# Patient Record
Sex: FEMALE
[De-identification: names, ages, dates, MRNs, and addresses within clinical notes are randomized per-mention and may not be internally consistent; named-entity substitution may affect disease eponyms.]

---

## 2022-09-07 ENCOUNTER — HOSPITAL ENCOUNTER (EMERGENCY)
Dept: HOSPITAL 5 - ED | Age: 57
LOS: 1 days | Discharge: HOME | End: 2022-09-08
Payer: SELF-PAY

## 2022-09-07 DIAGNOSIS — Z79.899: ICD-10-CM

## 2022-09-07 DIAGNOSIS — F15.10: Primary | ICD-10-CM

## 2022-09-07 LAB
ALBUMIN SERPL-MCNC: 4.3 G/DL (ref 3.9–5)
ALT SERPL-CCNC: 17 UNITS/L (ref 7–56)
BACTERIA #/AREA URNS HPF: (no result) /HPF
BASOPHILS # (AUTO): 0.1 K/MM3 (ref 0–0.1)
BASOPHILS NFR BLD AUTO: 1.2 % (ref 0–1.8)
BUN SERPL-MCNC: 25 MG/DL (ref 7–17)
BUN/CREAT SERPL: 36 %
CALCIUM SERPL-MCNC: 8.9 MG/DL (ref 8.4–10.2)
EOSINOPHIL # BLD AUTO: 0.3 K/MM3 (ref 0–0.4)
EOSINOPHIL NFR BLD AUTO: 4.7 % (ref 0–4.3)
HCT VFR BLD CALC: 42.1 % (ref 30.3–42.9)
HEMOLYSIS INDEX: 51
HGB BLD-MCNC: 14 GM/DL (ref 10.1–14.3)
LYMPHOCYTES # BLD AUTO: 2 K/MM3 (ref 1.2–5.4)
LYMPHOCYTES NFR BLD AUTO: 26.3 % (ref 13.4–35)
MCHC RBC AUTO-ENTMCNC: 33 % (ref 30–34)
MCV RBC AUTO: 89 FL (ref 79–97)
MONOCYTES # (AUTO): 0.4 K/MM3 (ref 0–0.8)
MONOCYTES % (AUTO): 5.1 % (ref 0–7.3)
PLATELET # BLD: 285 K/MM3 (ref 140–440)
RBC # BLD AUTO: 4.75 M/MM3 (ref 3.65–5.03)
RBC #/AREA URNS HPF: 1 /HPF (ref 0–6)
WBC #/AREA URNS HPF: 3 /HPF (ref 0–6)

## 2022-09-07 PROCEDURE — 99285 EMERGENCY DEPT VISIT HI MDM: CPT

## 2022-09-07 PROCEDURE — 80053 COMPREHEN METABOLIC PANEL: CPT

## 2022-09-07 PROCEDURE — 71045 X-RAY EXAM CHEST 1 VIEW: CPT

## 2022-09-07 PROCEDURE — G0480 DRUG TEST DEF 1-7 CLASSES: HCPCS

## 2022-09-07 PROCEDURE — 80307 DRUG TEST PRSMV CHEM ANLYZR: CPT

## 2022-09-07 PROCEDURE — 93005 ELECTROCARDIOGRAM TRACING: CPT

## 2022-09-07 PROCEDURE — 96361 HYDRATE IV INFUSION ADD-ON: CPT

## 2022-09-07 PROCEDURE — 36415 COLL VENOUS BLD VENIPUNCTURE: CPT

## 2022-09-07 PROCEDURE — 85025 COMPLETE CBC W/AUTO DIFF WBC: CPT

## 2022-09-07 PROCEDURE — 84484 ASSAY OF TROPONIN QUANT: CPT

## 2022-09-07 PROCEDURE — 96375 TX/PRO/DX INJ NEW DRUG ADDON: CPT

## 2022-09-07 PROCEDURE — 96374 THER/PROPH/DIAG INJ IV PUSH: CPT

## 2022-09-07 PROCEDURE — 80320 DRUG SCREEN QUANTALCOHOLS: CPT

## 2022-09-07 PROCEDURE — 82140 ASSAY OF AMMONIA: CPT

## 2022-09-07 PROCEDURE — 81001 URINALYSIS AUTO W/SCOPE: CPT

## 2022-09-07 PROCEDURE — 70450 CT HEAD/BRAIN W/O DYE: CPT

## 2022-09-07 NOTE — XRAY REPORT
CHEST 1 VIEW 



INDICATION: 

Altered mental status, unresponsive.



COMPARISON: 

None



FINDINGS:



SUPPORT DEVICES: None.



HEART: Within normal limits. 



LUNGS/PLEURA: No acute air space or interstitial disease. 



ADDITIONAL FINDINGS: None.







IMPRESSION:

1. No acute findings.



Signer Name: Gagan Barksdale MD 

Signed: 9/7/2022 10:04 PM

Workstation Name: [id number]

## 2022-09-07 NOTE — EMERGENCY DEPARTMENT REPORT
HPI





- General


Chief Complaint: Altered Mental Status


PUI?: No


Time Seen by Provider: 09/07/22 15:23





- HPI


HPI: 





57-year-old female with unknown past medical history brought in by EMS for 

unresponsiveness.  EMS reports that the patient was in police custody and was 

found by police personnel to have packets of meth on her person.  In route the 

patient became unresponsive and per their report, EMS states that the patient 

took the meth.  They gave the patient 0.4 mg of Narcan prior to arrival but the 

patient did not improve.  Unable to obtain remainder of review of systems 

secondary to patient's unresponsiveness and altered mental status at the time of

arrival





ED Past Medical Hx





- Past Medical History


Previous Medical History?: Yes


Additional medical history: Unknown, patient is currently unresponsive, unable 

to obtain





ED Review of Systems


ROS: 


Stated complaint: UNRESPONSIVE


Other details as noted in HPI





Comment: Unobtainable due to pts medical conditions





Physical Exam





- Physical Exam


General: 





Gen:  female, middle-aged appearing, lying on stretcher, intermittently

opening her eyes, but nonverbal to stimuli, requiring multiple sternal rubs


HEENT: Normocephalic atraumatic pupils pinpoint nad round; no reactivity to 

light; sclerae anicteric


Neck: Full range of motion, no midline spinal tenderness palpation, no JVD, no 

carotid bruits, no nuchal rigidity


CVS: S1-S2 regular rate and rhythm with no gallops rubs or murmurs, chest wall 

nontender


Pulmonary: Clear to auscultation bilaterally, no wheezes rales or rhonchi


Abdomen: Soft nondistended nontender no guarding or rebound tenderness, no 

palpable deformities or step-offs, normal active bowel sounds, no 

hepatosplenomegaly, no pulsatile masses


Back: Full range of motion, no midline spinal tenderness palpation, no palpable 

deformities or step-offs


: Grossly unremarkable,


Extremities: No cyanosis no clubbing no edema, intact distal peripheral pulses,


Integumentary: Skin normal, no petechia no purpura no abscess no lacerations no 

evidence of trauma no evidence of infection


Neuro: GCS 8; pt minimally responsive; will nodd her nead "no" when pt's name is

called; intermittently moving bilateral upper and lower extremities, strength is

5 out of 5 in all extremities, unable to obtain remainder of neuro exam 

secondary to patient's mental status


Psych: GCS 8, patient really responsive





ED Course





- Reevaluation(s)


Reevaluation #1: 





09/07/22 20:54


Patient is asleep but more easily arousable.  She is moving all extremities.  

She is verbal but continues to remain sleepy.  We will continue to monitor.





ED Medical Decision Making





- Lab Data


Result diagrams: 


                                 09/07/22 15:55





                                 09/07/22 15:55





- Radiology Data


Radiology results: pending





- Medical Decision Making





57-year-old female with unknown past medical history brought in by EMS for 

unresponsiveness and reports of presumptive methamphetamine overdose while in 

police custody.  Vital stable.  Patient given Narcan multiple times here with 

mild improvement in her mental status.  Her vitals remained normal.  Serum labs 

grossly unremarkable.  Urine drug screen positive for methamphetamines.  CT scan

of head as well as chest x-ray have not been resulted.





I spoke to the x-ray technicians concerning this and she states that the images 

are "stuck in queue and were having problems getting the reports."





Due to change in provider shift time at 9 PM, patient has been signed out to Dr. ALEX Dimas for follow-up of CT scan head and chest x-ray results, in addition to 

reassessment of the patient's clinical condition.  If the pt's diagnostic 

diagnostic imaging results are grossly unremarkable and the patient has 

significantly improved and her mental status and is ambulatory, per my 

assessment she may be discharged to home.  This will be determined by .




Critical care attestation.: 


If time is entered above; I have spent that time in minutes in the direct care 

of this critically ill patient, excluding procedure time.








ED Disposition


Clinical Impression: 


 Methamphetamine abuse, Unresponsiveness





Disposition: 30 STILL A PATIENT


Is pt being admited?: No


Does the pt Need Aspirin: No


Condition: Stable


Referrals: 


PRIMARY CARE,MD [Primary Care Provider] - 3-5 Days

## 2022-09-08 VITALS — SYSTOLIC BLOOD PRESSURE: 140 MMHG | DIASTOLIC BLOOD PRESSURE: 80 MMHG

## 2022-09-08 NOTE — CAT SCAN REPORT
CT HEAD WITHOUT CONTRAST



INDICATION / CLINICAL INFORMATION:

UNRESPOSIVE.



TECHNIQUE:

All CT scans at this location are performed using CT dose reduction for ALARA by means of automated e
xposure control. 



COMPARISON:

None available.



FINDINGS:

HEMORRHAGE: No evidence of intracranial hemorrhage or extra-axial fluid collection.

EXTRA-AXIAL SPACES: Cortical sulci, sylvian fissures and basilar cisterns have an unremarkable appear
ance.

VENTRICULAR SYSTEM: The third and lateral ventricles are of normal size and configuration.

CEREBRAL PARENCHYMA: No areas of abnormal brain parenchymal attenuation are identified. There is no i
ndication of recent infarction. 

MIDLINE SHIFT OR HERNIATION: There is no mass effect.

CEREBELLUM / BRAINSTEM: Brainstem and cerebellum have an unremarkable appearance.

MIDLINE STRUCTURES:No abnormalities of the pituitary gland or pineal region are identified.

INTRACRANIAL VESSELS:No abnormalities are identified on this noncontrast head CT.

ORBITS: visualized portions of the orbits have an unremarkable appearance.

SOFT TISSUES of HEAD: No significant abnormality.

CALVARIUM: Evaluation of bone windows reveals no abnormalities.

PARANASAL SINUSES / MASTOID AIR CELLS: Visualized portions of the paranasal sinuses are free from inf
lammatory mucosal disease. Mastoid air cells are normally pneumatized.







IMPRESSION:

1. No significant intracranial abnormality identified on head CT without contrast..



Signer Name: Ace Estrada MD 

Signed: 9/7/2022 4:30 PM

Workstation Name: [id number]

## 2022-09-08 NOTE — EMERGENCY DEPARTMENT REPORT
Jessie Doc





- Documentation


Documentation: 





57-year-old female presents to the hospital alteration in mental status second

jessa to drug intoxication.  Patient says she signed out to overnight provider Dr. Dimas as per Dr. Espinal's note. 





I noted pt still in the ed without dispo





Nurse informs me that during ED stay patient has been alert, awake, and 

ambulatory.  At time my evaluation is sleeping but arousable and able to tell me

her name.  Labs unremarkable.  UDS positive for amphetamines consistent with 

history of present illness, chest x-ray and CT head unremarkable.  Vital signs 

stable





Patient will be discharged with outpatient follow-up with substance abuse 

resources

## 2022-09-08 NOTE — ELECTROCARDIOGRAPH REPORT
[facility]

                                       

Test Date:    2022               Test Time:    23:20:06

Pat Name:     VIVIANA ARMSTRONG        Department:   

Patient ID:   [id number]          Room:          

Gender:       F                        Technician:   JOVANY

:          1965               Requested By: SARAH CONTRERAS

Order Number: [id number]             Reading MD:   Eligio Cannon

                                 Measurements

Intervals                              Axis          

Rate:         66                       P:            63

NC:           176                      QRS:          23

QRSD:         101                      T:            74

QT:           418                                    

QTc:          440                                    

                           Interpretive Statements

Sinus rhythm

No previous ECG available for comparison

Electronically Signed On 2022 10:45:02 EDT by Eligio Cannon